# Patient Record
Sex: FEMALE | Race: WHITE | NOT HISPANIC OR LATINO | Employment: STUDENT | URBAN - METROPOLITAN AREA
[De-identification: names, ages, dates, MRNs, and addresses within clinical notes are randomized per-mention and may not be internally consistent; named-entity substitution may affect disease eponyms.]

---

## 2022-12-10 ENCOUNTER — APPOINTMENT (EMERGENCY)
Dept: ULTRASOUND IMAGING | Facility: HOSPITAL | Age: 22
End: 2022-12-10

## 2022-12-10 ENCOUNTER — HOSPITAL ENCOUNTER (EMERGENCY)
Facility: HOSPITAL | Age: 22
Discharge: HOME | End: 2022-12-10
Attending: EMERGENCY MEDICINE | Admitting: EMERGENCY MEDICINE

## 2022-12-10 VITALS
WEIGHT: 153.44 LBS | SYSTOLIC BLOOD PRESSURE: 197 MMHG | DIASTOLIC BLOOD PRESSURE: 98 MMHG | OXYGEN SATURATION: 100 % | RESPIRATION RATE: 16 BRPM | HEART RATE: 76 BPM | BODY MASS INDEX: 26.2 KG/M2 | TEMPERATURE: 98.4 F | HEIGHT: 64 IN

## 2022-12-10 DIAGNOSIS — N93.8 DYSFUNCTIONAL UTERINE BLEEDING: Primary | ICD-10-CM

## 2022-12-10 LAB
BACTERIA UR QL AUTO: NORMAL /HPF
BILIRUB UR QL STRIP: NEGATIVE
CLARITY UR: CLEAR
COLOR UR: YELLOW
EXT PREGNANCY TEST URINE: NEGATIVE
EXT. CONTROL: NORMAL
GLUCOSE UR STRIP-MCNC: NEGATIVE MG/DL
HGB UR QL STRIP.AUTO: ABNORMAL
KETONES UR STRIP-MCNC: NEGATIVE MG/DL
LEUKOCYTE ESTERASE UR QL STRIP: NEGATIVE
NITRITE UR QL STRIP: NEGATIVE
NON-SQ EPI CELLS URNS QL MICRO: NORMAL /HPF
PH UR STRIP.AUTO: 6.5 [PH]
PROT UR STRIP-MCNC: NEGATIVE MG/DL
RBC #/AREA URNS AUTO: NORMAL /HPF
SP GR UR STRIP.AUTO: <=1.005 (ref 1–1.03)
UROBILINOGEN UR QL STRIP.AUTO: 0.2 E.U./DL
WBC #/AREA URNS AUTO: NORMAL /HPF

## 2022-12-11 LAB
C GLABRATA DNA VAG QL NAA+PROBE: NEGATIVE
C KRUSEI DNA VAG QL NAA+PROBE: NEGATIVE
CANDIDA SP 6 PNL VAG NAA+PROBE: NEGATIVE
T VAGINALIS DNA VAG QL NAA+PROBE: NEGATIVE
VAGINOSIS/ITIS DNA PNL VAG PROBE+SIG AMP: NEGATIVE

## 2022-12-11 NOTE — ED PROVIDER NOTES
History  Chief Complaint   Patient presents with   • Vaginal Bleeding     Reports IUD x4 years, normal spots, started with heavier bleeding today, used two tampons today and episode of toilet bowl full of blood, noticed a difference in length of strings     This is a 82-year-old female with no significant past medical history presenting to the emergency department today for vaginal bleeding, vaginal discharge, and for approximately 2 days  The patient had an intrauterine device placed approximately 4 years ago  She feels as though she can "the intrauterine device coming out"  She also feels as though the string length has increased in her vagina  She notes then white vaginal discharge that is not itchy, foul-smelling, or painful  She denies any urinary symptoms  Uterine cramping is midline and non-radiating  She denies new sexual contacts  She notes prior to a few months ago she did not have her period at all  Does note worsening midline uterine cramping with increasing vaginal bleeding  She denies any chest pain or shortness of breath  No flank pain  The patient denies other complaints at this time  History provided by:  Patient   used: No    Vaginal Bleeding  Severity:  Moderate  Onset quality:  Gradual  Duration:  2 days  Timing:  Intermittent  Progression:  Worsening  Chronicity:  New  Possible pregnancy: yes    Relieved by:  Nothing  Worsened by:  Nothing  Ineffective treatments:  None tried  Associated symptoms: vaginal discharge    Associated symptoms: no abdominal pain, no back pain, no dizziness, no dyspareunia, no dysuria, no fatigue, no fever and no nausea        Prior to Admission Medications   Prescriptions Last Dose Informant Patient Reported?  Taking?   lisdexamfetamine (VYVANSE) 20 MG capsule 12/10/2022  Yes Yes   Sig: Take 20 mg by mouth      Facility-Administered Medications: None       Past Medical History:   Diagnosis Date   • ADHD    • Anxiety    • Hypertension        Past Surgical History:   Procedure Laterality Date   • INDUCED          History reviewed  No pertinent family history  I have reviewed and agree with the history as documented  E-Cigarette/Vaping   • E-Cigarette Use Never User      E-Cigarette/Vaping Substances     Social History     Tobacco Use   • Smoking status: Never     Passive exposure: Never   • Smokeless tobacco: Never   Vaping Use   • Vaping Use: Never used   Substance Use Topics   • Alcohol use: Yes   • Drug use: Yes     Types: Marijuana       Review of Systems   Constitutional: Negative for appetite change, chills, diaphoresis, fatigue and fever  Eyes: Negative for visual disturbance  Respiratory: Negative for cough, chest tightness, shortness of breath and wheezing  Cardiovascular: Negative for chest pain, palpitations and leg swelling  Gastrointestinal: Negative for abdominal pain, constipation, diarrhea, nausea and vomiting  Genitourinary: Positive for menstrual problem, pelvic pain, vaginal bleeding and vaginal discharge  Negative for dyspareunia, dysuria, flank pain and frequency  Musculoskeletal: Negative for back pain, neck pain and neck stiffness  Skin: Negative for rash and wound  Neurological: Negative for dizziness, seizures, syncope, weakness, light-headedness, numbness and headaches  Psychiatric/Behavioral: Negative for confusion  All other systems reviewed and are negative  Physical Exam  Physical Exam  Vitals and nursing note reviewed  Constitutional:       General: She is not in acute distress  Appearance: Normal appearance  She is normal weight  She is not ill-appearing, toxic-appearing or diaphoretic  HENT:      Head: Normocephalic and atraumatic  Nose: Nose normal  No congestion or rhinorrhea  Mouth/Throat:      Mouth: Mucous membranes are moist       Pharynx: No oropharyngeal exudate or posterior oropharyngeal erythema  Eyes:      General: No scleral icterus  Right eye: No discharge  Left eye: No discharge  Extraocular Movements: Extraocular movements intact  Pupils: Pupils are equal, round, and reactive to light  Cardiovascular:      Rate and Rhythm: Normal rate and regular rhythm  Pulses: Normal pulses  Heart sounds: Normal heart sounds  No murmur heard  No friction rub  No gallop  Pulmonary:      Effort: Pulmonary effort is normal  No respiratory distress  Breath sounds: Normal breath sounds  No stridor  No wheezing, rhonchi or rales  Chest:      Chest wall: No tenderness  Abdominal:      General: Abdomen is flat  There is no distension  Palpations: Abdomen is soft  Tenderness: There is no abdominal tenderness  There is no right CVA tenderness, left CVA tenderness, guarding or rebound  Comments: Soft, nontender, nondistended, and without organomegaly   Genitourinary:     Comments: Patient defers vaginal examination  Musculoskeletal:         General: Normal range of motion  Cervical back: Normal range of motion  No tenderness  Right lower leg: No edema  Left lower leg: No edema  Skin:     General: Skin is warm and dry  Capillary Refill: Capillary refill takes less than 2 seconds  Coloration: Skin is not jaundiced or pale  Neurological:      General: No focal deficit present  Mental Status: She is alert and oriented to person, place, and time  Mental status is at baseline     Psychiatric:         Mood and Affect: Mood normal          Behavior: Behavior normal          Vital Signs  ED Triage Vitals [12/10/22 1917]   Temperature Pulse Respirations Blood Pressure SpO2   98 4 °F (36 9 °C) 76 16 (!) 197/98 100 %      Temp Source Heart Rate Source Patient Position - Orthostatic VS BP Location FiO2 (%)   Oral Monitor Sitting Left arm --      Pain Score       4           Vitals:    12/10/22 1917   BP: (!) 197/98   Pulse: 76   Patient Position - Orthostatic VS: Sitting Visual Acuity      ED Medications  Medications - No data to display    Diagnostic Studies  Results Reviewed     Procedure Component Value Units Date/Time    Urine Microscopic [[id number]]  (Normal) Collected: 12/10/22 1943    Lab Status: Final result Specimen: Urine, Other Updated: 12/10/22 1958     RBC, UA None Seen /hpf      WBC, UA None Seen /hpf      Epithelial Cells Occasional /hpf      Bacteria, UA None Seen /hpf     Molecular Vaginal Panel [[id number]] Collected: 12/10/22 1948    Lab Status: In process Specimen: Genital from Vaginal Updated: 12/10/22 1956    UA w Reflex to Microscopic w Reflex to Culture [[id number]]  (Abnormal) Collected: 12/10/22 1943    Lab Status: Final result Specimen: Urine, Other Updated: 12/10/22 1951     Color, UA Yellow     Clarity, UA Clear     Specific Gravity, UA <=1 005     pH, UA 6 5     Leukocytes, UA Negative     Nitrite, UA Negative     Protein, UA Negative mg/dl      Glucose, UA Negative mg/dl      Ketones, UA Negative mg/dl      Urobilinogen, UA 0 2 E U /dl      Bilirubin, UA Negative     Occult Blood, UA Trace-lysed    POCT pregnancy, urine [[id number]]  (Normal) Resulted: 12/10/22 1948    Lab Status: Final result Updated: 12/10/22 1949     EXT Preg Test, Ur Negative     Control Valid                 US pelvis complete w transvaginal   Final Result by Tito Brown MD (12/10 8323)       IUD in appropriate sonographic fundal position  Workstation performed: [id number]                    Procedures  Procedures         ED Course                                             MDM  Number of Diagnoses or Management Options  Dysfunctional uterine bleeding: new and requires workup  Diagnosis management comments: 22-year-old female presenting to the emergency department today for vaginal bleeding, uterine pain, and alleged lengthening of her intrauterine device strings  Vital signs are stable  Physical exam is reassuring    Patient refused vaginal examination  Pregnancy test was negative  No evidence of urinary tract infection  Pelvic ultrasound was ordered  At this time, the patient was signed out to Dr Nell Da Silva pending ultrasound results  Please see his independent emergency department sign off note for further information about medical decision making, imaging findings, lab findings, and final medical disposition  The patient and/or patient's proxy verify their understanding and agree to the plan at this time  All questions answered to the patient and/or their proxy's satisfaction  All labs reviewed and utilized in the medical decision making process  All radiology studies independently viewed by me and interpreted by the radiologist  Portions of the record may have been created with voice recognition software   Occasional wrong word or "sound a like" substitutions may have occurred due to the inherent limitations of voice recognition software   Read the chart carefully and recognize, using context, where substitutions have occurred  Amount and/or Complexity of Data Reviewed  Clinical lab tests: ordered and reviewed  Tests in the radiology section of CPT®: ordered  Review and summarize past medical records: yes  Discuss the patient with other providers: yes (Dr Nell Da Silva)    Patient Progress  Patient progress: stable      Disposition  Final diagnoses:   Dysfunctional uterine bleeding     Time reflects when diagnosis was documented in both MDM as applicable and the Disposition within this note     Time User Action Codes Description Comment    12/10/2022 10:47 PM Sergio Colunga Add [N93 8] Dysfunctional uterine bleeding       ED Disposition     ED Disposition   Discharge    Condition   Stable    Date/Time   Sat Dec 10, 2022 10:47 PM    Comment   Saranya Traylor discharge to home/self care                 Follow-up Information     Follow up With Specialties Details Why Contact Info    Your gynecologist  Call in 1 day            Discharge Medication List as of 12/10/2022 10:48 PM      CONTINUE these medications which have NOT CHANGED    Details   lisdexamfetamine (VYVANSE) 20 MG capsule Take 20 mg by mouth, Starting Thu 11/3/2022, Historical Med             No discharge procedures on file      PDMP Review     None          ED Provider  Electronically Signed by           Darinel Freed PA-C  12/11/22 5431

## 2022-12-11 NOTE — ED CARE HANDOFF
Emergency Department Sign Out Note        Sign out and transfer of care from physician assistant  See Separate Emergency Department note  The patient, Suad Celeste, was evaluated by the previous provider for vaginal bleeding  Workup Completed:    Labs and ultrasound    ED Course / Workup Pending (followup): Ultrasound reviewed with patient  She has no further bleeding  She is hemodynamically stable  Plan is to have her follow-up with her gynecologist                                     Procedures  MDM        Disposition  Final diagnoses:   Dysfunctional uterine bleeding     Time reflects when diagnosis was documented in both MDM as applicable and the Disposition within this note     Time User Action Codes Description Comment    12/10/2022 10:47 PM Karen Mcdonald Add [N93 8] Dysfunctional uterine bleeding       ED Disposition     ED Disposition   Discharge    Condition   Stable    Date/Time   Sat Dec 10, 2022 10:47 PM    Comment   Suad Celeste discharge to home/self care  Follow-up Information     Follow up With Specialties Details Why Contact Info    Your gynecologist  Call in 1 day          Discharge Medication List as of 12/10/2022 10:48 PM      CONTINUE these medications which have NOT CHANGED    Details   lisdexamfetamine (VYVANSE) 20 MG capsule Take 20 mg by mouth, Starting Thu 11/3/2022, Historical Med           No discharge procedures on file         ED Provider  Electronically Signed by     Chris Bush MD  12/11/22 6390